# Patient Record
Sex: FEMALE | URBAN - METROPOLITAN AREA
[De-identification: names, ages, dates, MRNs, and addresses within clinical notes are randomized per-mention and may not be internally consistent; named-entity substitution may affect disease eponyms.]

---

## 2024-06-14 ENCOUNTER — APPOINTMENT (RX ONLY)
Dept: URBAN - METROPOLITAN AREA CLINIC 170 | Facility: CLINIC | Age: 49
Setting detail: DERMATOLOGY
End: 2024-06-14

## 2024-06-14 DIAGNOSIS — D22 MELANOCYTIC NEVI: ICD-10-CM

## 2024-06-14 PROBLEM — D48.5 NEOPLASM OF UNCERTAIN BEHAVIOR OF SKIN: Status: ACTIVE | Noted: 2024-06-14

## 2024-06-14 PROCEDURE — ? BIOPSY BY SHAVE METHOD

## 2024-06-14 PROCEDURE — 11102 TANGNTL BX SKIN SINGLE LES: CPT

## 2024-06-14 ASSESSMENT — LOCATION SIMPLE DESCRIPTION DERM: LOCATION SIMPLE: LEFT UPPER BACK

## 2024-06-14 ASSESSMENT — LOCATION DETAILED DESCRIPTION DERM: LOCATION DETAILED: LEFT SUPERIOR UPPER BACK

## 2024-06-14 ASSESSMENT — LOCATION ZONE DERM: LOCATION ZONE: TRUNK

## 2025-02-14 ENCOUNTER — APPOINTMENT (OUTPATIENT)
Dept: URBAN - METROPOLITAN AREA CLINIC 167 | Facility: CLINIC | Age: 50
Setting detail: DERMATOLOGY
End: 2025-02-14

## 2025-02-14 DIAGNOSIS — L81.4 OTHER MELANIN HYPERPIGMENTATION: ICD-10-CM

## 2025-02-14 DIAGNOSIS — D22 MELANOCYTIC NEVI: ICD-10-CM

## 2025-02-14 DIAGNOSIS — B36.0 PITYRIASIS VERSICOLOR: ICD-10-CM

## 2025-02-14 DIAGNOSIS — L57.8 OTHER SKIN CHANGES DUE TO CHRONIC EXPOSURE TO NONIONIZING RADIATION: ICD-10-CM

## 2025-02-14 DIAGNOSIS — L82.1 OTHER SEBORRHEIC KERATOSIS: ICD-10-CM

## 2025-02-14 DIAGNOSIS — Z71.89 OTHER SPECIFIED COUNSELING: ICD-10-CM

## 2025-02-14 PROBLEM — D22.5 MELANOCYTIC NEVI OF TRUNK: Status: ACTIVE | Noted: 2025-02-14

## 2025-02-14 PROCEDURE — 99213 OFFICE O/P EST LOW 20 MIN: CPT

## 2025-02-14 PROCEDURE — ? EDUCATIONAL RESOURCES PROVIDED

## 2025-02-14 PROCEDURE — ? COUNSELING

## 2025-02-14 PROCEDURE — ? PRESCRIPTION

## 2025-02-14 PROCEDURE — ? TREATMENT REGIMEN

## 2025-02-14 RX ORDER — KETOCONAZOLE 20 MG/ML
SHAMPOO, SUSPENSION TOPICAL QD
Qty: 120 | Refills: 3 | Status: ERX | COMMUNITY
Start: 2025-02-14

## 2025-02-14 RX ORDER — PHARMACY COMPOUNDING ACCESSORY
EACH MISCELLANEOUS
Qty: 30 | Refills: 6 | Status: ERX | COMMUNITY
Start: 2025-02-14

## 2025-02-14 RX ADMIN — KETOCONAZOLE: 20 SHAMPOO, SUSPENSION TOPICAL at 00:00

## 2025-02-14 RX ADMIN — Medication: at 00:00

## 2025-02-14 ASSESSMENT — LOCATION SIMPLE DESCRIPTION DERM
LOCATION SIMPLE: LEFT FOREHEAD
LOCATION SIMPLE: RIGHT UPPER BACK
LOCATION SIMPLE: UPPER BACK

## 2025-02-14 ASSESSMENT — LOCATION DETAILED DESCRIPTION DERM
LOCATION DETAILED: RIGHT SUPERIOR MEDIAL UPPER BACK
LOCATION DETAILED: LEFT MEDIAL FOREHEAD
LOCATION DETAILED: SUPERIOR THORACIC SPINE
LOCATION DETAILED: INFERIOR THORACIC SPINE

## 2025-02-14 ASSESSMENT — LOCATION ZONE DERM
LOCATION ZONE: FACE
LOCATION ZONE: TRUNK

## 2025-02-14 NOTE — PROCEDURE: TREATMENT REGIMEN
Detail Level: Zone
Initiate Treatment: pharmacy compounding accessory - Tretinoin 0.025% cream in Glycerin base\\nApply pea size amount to face at bedtime. Apply a moisturizer after. Start every other night, slowly increase to nightly as tolerated
Plan: Skincare handout provided \\nReferred to [facility] for cosmetic consult
Otc Regimen: Selsun blue or Head and Shoulders
Initiate Treatment: ketoconazole 2 % shampoo - Apply to affected areas of the neck, chest, and back prior to showering. Leave on 5 mins, rinse off. Complete daily for 2-3weeks, then 2-3x weekly for maintenance.

## 2025-03-25 ENCOUNTER — APPOINTMENT (OUTPATIENT)
Dept: URBAN - METROPOLITAN AREA CLINIC 173 | Facility: CLINIC | Age: 50
Setting detail: DERMATOLOGY
End: 2025-03-25

## 2025-03-25 DIAGNOSIS — Z41.9 ENCOUNTER FOR PROCEDURE FOR PURPOSES OTHER THAN REMEDYING HEALTH STATE, UNSPECIFIED: ICD-10-CM

## 2025-03-25 PROCEDURE — ? COSMETIC CONSULTATION: FRACTIONAL RESURFACING

## 2025-03-25 PROCEDURE — ? TREATMENT REGIMEN

## 2025-03-25 PROCEDURE — ? COSMETIC CONSULTATION: BOTULINUM TOXIN

## 2025-03-25 PROCEDURE — ? COSMETIC CONSULTATION: FILLERS

## 2025-03-25 PROCEDURE — ? PRESCRIPTION

## 2025-03-25 PROCEDURE — ? COSMETIC CONSULTATION: DAXXIFY

## 2025-03-25 RX ORDER — LIDOCAINE AND PRILOCAINE 25; 25 MG/G; MG/G
CREAM TOPICAL
Qty: 30 | Refills: 3 | Status: ERX | COMMUNITY
Start: 2025-03-25

## 2025-03-25 RX ADMIN — LIDOCAINE AND PRILOCAINE: 25; 25 CREAM TOPICAL at 00:00

## 2025-03-25 NOTE — PROCEDURE: TREATMENT REGIMEN
Otc Regimen: Besha Collagen A.M. with coffee.
Initiate Treatment: A.M- wash with gentle cleanser, apply TNS first, then apply SPF ([employer], patients choice)\\nP.M. - was with gentle cleanser, apply HA serum, Tretinoin, follow with a moisturizer ([employer] , patient choice), patient advised to try Kristyn Dharmesh.
Samples Given: Popego HA serum\\nNia Lotion.
Detail Level: Zone

## 2025-05-27 ENCOUNTER — APPOINTMENT (OUTPATIENT)
Dept: URBAN - METROPOLITAN AREA CLINIC 173 | Facility: CLINIC | Age: 50
Setting detail: DERMATOLOGY
End: 2025-05-27

## 2025-05-27 DIAGNOSIS — Z41.9 ENCOUNTER FOR PROCEDURE FOR PURPOSES OTHER THAN REMEDYING HEALTH STATE, UNSPECIFIED: ICD-10-CM

## 2025-05-27 PROCEDURE — ? BOTOX

## 2025-05-27 PROCEDURE — ? OTHER (COSMETIC)

## 2025-05-27 PROCEDURE — ? FILLERS

## 2025-05-27 NOTE — PROCEDURE: FILLERS
Decollete Filler Volume In Cc: 0
Aspiration Statement: Aspiration was performed prior to injecting site with filler.
Additional Area 3 Location: chin
Filler: Juvederm Voluma XC
Additional Area 1 Location: Face
Additional Area 1 Volume In Cc: 1
Lot #: [id number]
Include Cannula Size?: 25G
Cheeks Filler Volume In Cc: 2
Include Cannula Length?: 1.5 inch
Include Cannula Information In Note?: Yes
Additional Area 2 Location: ear lobes
Anesthesia Type: 2% lidocaine with epinephrine and a 1:12 solution of 8.4% sodium bicarbonate
Anesthesia Volume In Cc: 0.4
Expiration Date (Month Year): 08/31/2020
Lot #: [id number]
Filler: RHA 3
Consent: Written consent obtained. Risks include but not limited to bruising, beading, irregular texture, ulceration, infection, allergic reaction, scar formation, incomplete augmentation, temporary nature, procedural pain.
Include Cannula Information In Note?: No
Post-Care Instructions: Patient instructed to apply ice to reduce swelling.
Expiration Date (Month Year): 6/1/26
Topical Anesthesia?: 2.5% lidocaine, 2.5% prilocaine
Detail Level: Zone
Price (Use Numbers Only, No Special Characters Or $): 4195
Lot #: [id number]
Expiration Date (Month Year): 5/19/27
Map Statment: See Attach Map for Complete Details
Additional Area 2 Location: Hands

## 2025-05-27 NOTE — PROCEDURE: BOTOX
Show Anterior Platysmal Band Units: Yes
Nasal Root Units: 0
Post-Care Instructions: Patient instructed to not lie down for 4 hours and limit physical activity for 24 hours. Patient instructed not to travel by airplane for 48 hours.
Show Right And Left Periorbital Units: No
Incrementing Botox Units: By 0.5 Units
Lot #: [id number]
Additional Area 1 Location: Face
Dilution (U/0.1 Cc): 1
Additional Area 1 Units: 52
Detail Level: Zone
Additional Area 3 Location: masseters
Expiration Date (Month Year): 6/27
Price (Use Numbers Only, No Special Characters Or $): 020
Additional Area 2 Location: Upper cutaneous lip
Consent: Written consent obtained. Risks include but not limited to lid/brow ptosis, bruising, swelling, diplopia, temporary effect, incomplete chemical denervation.

## 2025-06-11 ENCOUNTER — APPOINTMENT (OUTPATIENT)
Dept: URBAN - METROPOLITAN AREA CLINIC 173 | Facility: CLINIC | Age: 50
Setting detail: DERMATOLOGY
End: 2025-06-11

## 2025-06-11 DIAGNOSIS — Z41.9 ENCOUNTER FOR PROCEDURE FOR PURPOSES OTHER THAN REMEDYING HEALTH STATE, UNSPECIFIED: ICD-10-CM

## 2025-06-11 PROCEDURE — ? BOTOX

## 2025-06-11 PROCEDURE — ? COSMETIC CONSULTATION: ELLACOR MICRO-CORING

## 2025-06-11 NOTE — PROCEDURE: BOTOX
Show Orbicularis Oculi Units: Yes
Additional Area 2 Location: Upper cutaneous lip
Lateral Platysmal Bands Units: 0
Detail Level: Zone
Show Right And Left Pupillary Line Units: No
Additional Area 3 Location: masseters
Expiration Date (Month Year): 6/27
Additional Area 1 Location: Face
Additional Area 1 Units: 1
Consent: Written consent obtained. Risks include but not limited to lid/brow ptosis, bruising, swelling, diplopia, temporary effect, incomplete chemical denervation.
Post-Care Instructions: Patient instructed to not lie down for 4 hours and limit physical activity for 24 hours. Patient instructed not to travel by airplane for 48 hours.
Lot #: [id number] *[facility] SPECIAL *
Incrementing Botox Units: By 0.5 Units